# Patient Record
Sex: MALE | Race: WHITE
[De-identification: names, ages, dates, MRNs, and addresses within clinical notes are randomized per-mention and may not be internally consistent; named-entity substitution may affect disease eponyms.]

---

## 2018-08-18 ENCOUNTER — HOSPITAL ENCOUNTER (EMERGENCY)
Dept: HOSPITAL 47 - EC | Age: 73
Discharge: HOME | End: 2018-08-18
Payer: MEDICARE

## 2018-08-18 VITALS — DIASTOLIC BLOOD PRESSURE: 79 MMHG | SYSTOLIC BLOOD PRESSURE: 195 MMHG

## 2018-08-18 VITALS — HEART RATE: 61 BPM | RESPIRATION RATE: 17 BRPM

## 2018-08-18 VITALS — TEMPERATURE: 97.5 F

## 2018-08-18 DIAGNOSIS — Z79.84: ICD-10-CM

## 2018-08-18 DIAGNOSIS — Z53.8: ICD-10-CM

## 2018-08-18 DIAGNOSIS — E11.22: ICD-10-CM

## 2018-08-18 DIAGNOSIS — R11.0: ICD-10-CM

## 2018-08-18 DIAGNOSIS — E87.5: ICD-10-CM

## 2018-08-18 DIAGNOSIS — E07.9: ICD-10-CM

## 2018-08-18 DIAGNOSIS — I12.9: ICD-10-CM

## 2018-08-18 DIAGNOSIS — Z95.0: ICD-10-CM

## 2018-08-18 DIAGNOSIS — R94.4: ICD-10-CM

## 2018-08-18 DIAGNOSIS — N17.9: ICD-10-CM

## 2018-08-18 DIAGNOSIS — Z86.73: ICD-10-CM

## 2018-08-18 DIAGNOSIS — Z79.899: ICD-10-CM

## 2018-08-18 DIAGNOSIS — K52.9: ICD-10-CM

## 2018-08-18 DIAGNOSIS — N13.30: ICD-10-CM

## 2018-08-18 DIAGNOSIS — R18.8: ICD-10-CM

## 2018-08-18 DIAGNOSIS — R53.1: Primary | ICD-10-CM

## 2018-08-18 DIAGNOSIS — N18.9: ICD-10-CM

## 2018-08-18 LAB
ALBUMIN SERPL-MCNC: 4.3 G/DL (ref 3.5–5)
ALP SERPL-CCNC: 87 U/L (ref 38–126)
ALT SERPL-CCNC: 38 U/L (ref 21–72)
ANION GAP SERPL CALC-SCNC: 12 MMOL/L
APTT BLD: 22.7 SEC (ref 22–30)
AST SERPL-CCNC: 25 U/L (ref 17–59)
BASOPHILS # BLD AUTO: 0 K/UL (ref 0–0.2)
BASOPHILS NFR BLD AUTO: 0 %
BUN SERPL-SCNC: 56 MG/DL (ref 9–20)
CALCIUM SPEC-MCNC: 9.1 MG/DL (ref 8.4–10.2)
CHLORIDE SERPL-SCNC: 108 MMOL/L (ref 98–107)
CK SERPL-CCNC: 132 U/L (ref 55–170)
CO2 SERPL-SCNC: 21 MMOL/L (ref 22–30)
EOSINOPHIL # BLD AUTO: 0 K/UL (ref 0–0.7)
EOSINOPHIL NFR BLD AUTO: 0 %
ERYTHROCYTE [DISTWIDTH] IN BLOOD BY AUTOMATED COUNT: 2.72 M/UL (ref 4.3–5.9)
ERYTHROCYTE [DISTWIDTH] IN BLOOD: 13.7 % (ref 11.5–15.5)
GLUCOSE BLD-MCNC: 295 MG/DL (ref 75–99)
GLUCOSE SERPL-MCNC: 297 MG/DL (ref 74–99)
GLUCOSE UR QL: (no result)
HCT VFR BLD AUTO: 27.5 % (ref 39–53)
HGB BLD-MCNC: 8.9 GM/DL (ref 13–17.5)
INR PPP: 1 (ref ?–1.2)
LYMPHOCYTES # SPEC AUTO: 0.3 K/UL (ref 1–4.8)
LYMPHOCYTES NFR SPEC AUTO: 3 %
MCH RBC QN AUTO: 32.8 PG (ref 25–35)
MCHC RBC AUTO-ENTMCNC: 32.3 G/DL (ref 31–37)
MCV RBC AUTO: 101.4 FL (ref 80–100)
MONOCYTES # BLD AUTO: 0.2 K/UL (ref 0–1)
MONOCYTES NFR BLD AUTO: 3 %
NEUTROPHILS # BLD AUTO: 7.9 K/UL (ref 1.3–7.7)
NEUTROPHILS NFR BLD AUTO: 93 %
PH UR: 7 [PH] (ref 5–8)
PLATELET # BLD AUTO: 146 K/UL (ref 150–450)
POTASSIUM SERPL-SCNC: 6.1 MMOL/L (ref 3.5–5.1)
PROT SERPL-MCNC: 6.9 G/DL (ref 6.3–8.2)
PROT UR QL: (no result)
PT BLD: 9.9 SEC (ref 9–12)
RBC UR QL: <1 /HPF (ref 0–5)
SODIUM SERPL-SCNC: 141 MMOL/L (ref 137–145)
SP GR UR: 1.01 (ref 1–1.03)
SQUAMOUS UR QL AUTO: <1 /HPF (ref 0–4)
TROPONIN I SERPL-MCNC: 0.01 NG/ML (ref 0–0.03)
UROBILINOGEN UR QL STRIP: <2 MG/DL (ref ?–2)
WBC # BLD AUTO: 8.5 K/UL (ref 3.8–10.6)
WBC #/AREA URNS HPF: 1 /HPF (ref 0–5)

## 2018-08-18 PROCEDURE — 84484 ASSAY OF TROPONIN QUANT: CPT

## 2018-08-18 PROCEDURE — 81001 URINALYSIS AUTO W/SCOPE: CPT

## 2018-08-18 PROCEDURE — 36415 COLL VENOUS BLD VENIPUNCTURE: CPT

## 2018-08-18 PROCEDURE — 74176 CT ABD & PELVIS W/O CONTRAST: CPT

## 2018-08-18 PROCEDURE — 80053 COMPREHEN METABOLIC PANEL: CPT

## 2018-08-18 PROCEDURE — 84132 ASSAY OF SERUM POTASSIUM: CPT

## 2018-08-18 PROCEDURE — 83605 ASSAY OF LACTIC ACID: CPT

## 2018-08-18 PROCEDURE — 96375 TX/PRO/DX INJ NEW DRUG ADDON: CPT

## 2018-08-18 PROCEDURE — 85730 THROMBOPLASTIN TIME PARTIAL: CPT

## 2018-08-18 PROCEDURE — 93005 ELECTROCARDIOGRAM TRACING: CPT

## 2018-08-18 PROCEDURE — 71046 X-RAY EXAM CHEST 2 VIEWS: CPT

## 2018-08-18 PROCEDURE — 82553 CREATINE MB FRACTION: CPT

## 2018-08-18 PROCEDURE — 85610 PROTHROMBIN TIME: CPT

## 2018-08-18 PROCEDURE — 99285 EMERGENCY DEPT VISIT HI MDM: CPT

## 2018-08-18 PROCEDURE — 82550 ASSAY OF CK (CPK): CPT

## 2018-08-18 PROCEDURE — 85025 COMPLETE CBC W/AUTO DIFF WBC: CPT

## 2018-08-18 PROCEDURE — 96374 THER/PROPH/DIAG INJ IV PUSH: CPT

## 2018-08-18 NOTE — CT
EXAMINATION TYPE: CT abdomen pelvis wo con

 

DATE OF EXAM: 8/18/2018

 

HISTORY: Generalized pain with nausea

 

CT DLP: 275.4 mGycm.  Automated Exposure Control for Dose Reduction was Utilized.

 

TECHNIQUE:  CT scan of the abdomen and pelvis is performed without oral or IV contrast.

 

COMPARISON: NONE

 

FINDINGS:  Within the limitations of a non-contrast study, the following observations are made.

 

LUNG BASES: There are trace right greater than left pleural effusions. There is cardiomegaly with rig
ht-sided pacemaker. There is trace pericardial effusion.

 

LIVER/GB: No significant abnormality is appreciated.

 

PANCREAS: No significant abnormality is seen.

 

SPLEEN: No significant abnormality is seen.

 

ADRENALS: No significant abnormality is seen.

 

KIDNEYS: There is some cortical thinning in both kidneys. Central vascular calcification is present b
ilaterally. There is mild right-sided hydronephrosis. No obstructing calculi are clearly seen. Bladde
r is mildly distended.

 

BOWEL: Evaluation bowel is suboptimal secondary to lack of enteric contrast. There is no suspicious d
ilatation of the stomach. Majority of small bowel shows no suspicious dilatation. There is mild wall 
thickening in the right colon extending into the proximal transverse colon there is mild wall thicken
ing in the sigmoid colon. There are few scattered diverticula in the sigmoid colon appendix is gas-fi
lled and not dilated. There is mild ill-defined fluid throughout the mesentery including the paracoli
c gutters with similar some mild fluid surrounding the appendix

 

GENITAL ORGANS: Suboptimal evaluation prostate gland due to streak artifact from adjacent hips.

 

LYMPH NODES: No greater than 1cm abdominal or pelvic lymph nodes are appreciated.

 

OSSEOUS STRUCTURES: Metallic hardware from bilateral proximal femur surgery is identified causing str
eak artifact limiting evaluation of pelvic structures. Osseous structures are demineralized there is 
moderate compression fracture at T12 level with sclerosis presumed chronic. Moderate compression frac
ture L2 level is noted. There is advanced compression fracture L3 level with vertebroplasty. There is
 mild to moderate inferior compression fracture L5 level. There is mild compression fracture L1 level


 

OTHER: There is severe vascular calcification of smaller vessels including prominent vascular calcifi
cation of groin vessels.

 

IMPRESSION: No bowel obstruction. Mild diffuse abdominal and mesenteric ascites of uncertain etiology
. No well-formed fluid collection is seen. Possible multifocal colitis, correlate clinically. Subopti
mal evaluation without enteric contrast. Mild right-sided hydronephrosis of uncertain etiology as no 
obstructing calculus is clearly seen. Multiple chronic compression fractures noted.

## 2018-08-18 NOTE — XR
EXAMINATION TYPE: XR chest 2V

 

DATE OF EXAM: 8/18/2018

 

COMPARISON: Chest x-ray from August 9, 2016.CT chest August 6, 2016.

 

HISTORY: Weakness.

 

TECHNIQUE:  Frontal and lateral views of the chest are obtained.

 

FINDINGS:  Old fracture left humeral head level is partially imaged. There is redemonstration of card
iomegaly with dual lead pacemaker and atherosclerotic thoracic aorta. There is chronic parenchymal ch
inés without suspicious focal airspace opacity, pleural effusion, or pneumothorax seen bilaterally on
 current study. Chronic compression fracture felt present roughly L1 level on lateral view.

 

IMPRESSION:  Chronic changes and cardiomegaly without acute pulmonary process.

## 2018-08-18 NOTE — ED
General Adult HPI





- General


Chief complaint: Weakness


Stated complaint: NVD, STATES RENAL FAILURE


Source: patient


Mode of arrival: wheelchair


Limitations: no limitations





- History of Present Illness


Initial comments: 





Dictation was produced using dragon dictation software. please excuse any 

grammatical, word or spelling errors. 





Chief Complaint: 73-year-old male chronic kidney disease, arrhythmia presents 

with generalized weakness.





History of Present Illness: Patient is a retired cardiothoracic surgeon.  He 

states that he gets most of his medical care [place].  He bounces back between 

here and [place] for his personal life.  Patient states that over the past 

couple days he has been having nausea.  Denies any vomiting.  This date he has 

some abdominal cramping.  Patient has a history of diabetes.  He has been 

compliant with his medications.  Patient has a operating right upper extremity 

AV fistula that is placed in anticipation for future and imminent dialysis.  

Denies any constitutional symptoms.  Denies any pain in his flank or his back








The ROS documented in this emergency department record has been reviewed and 

confirmed by me.  Those systems with pertinent positive or negative responses 

have been documented in the HPI.  All other systems are other negative and/or 

noncontributory.





- Related Data


 Home Medications











 Medication  Instructions  Recorded  Confirmed


 


Amiodarone [Cordarone] 200 mg PO DAILY 07/15/16 08/18/18


 


Atorvastatin [Lipitor] 10 mg PO HS 07/15/16 08/18/18


 


Cholecalciferol [Vitamin D3] 2,000 unit PO BID 07/15/16 08/18/18


 


Florastor Probiotic 250 mg PO DAILY 07/15/16 08/18/18


 


Linagliptin [Tradjenta] 5 mg PO DAILY 07/15/16 08/18/18


 


NIFEdipine XL [Procardia XL] 30 mg PO DAILY 07/15/16 08/18/18


 


Sodium Bicarbonate Tab 650 mg PO DAILY 07/15/16 08/18/18


 


Denosumab [Prolia] 60 mg SQ Q180D 08/18/18 08/18/18


 


Docusate [Colace] 100 mg PO DAILY 08/18/18 08/18/18


 


Glimepiride [Amaryl] 4 mg PO BID 08/18/18 08/18/18


 


Labetalol HCl 100 mg PO BID 08/18/18 08/18/18


 


Levothyroxine Sodium [Synthroid] 125 mcg PO DAILY 08/18/18 08/18/18


 


traMADol HCL [Ultram] 50 mg PO Q6HR PRN 08/18/18 08/18/18








 Previous Rx's











 Medication  Instructions  Recorded


 


Aspirin 162 mg PO DAILY #0 chew 08/09/16


 


Metoclopramide HCl [Reglan] 10 mg PO BID PRN #12 tablet 08/18/18











 Allergies











Allergy/AdvReac Type Severity Reaction Status Date / Time


 


No Known Allergies Allergy   Verified 08/18/18 16:54














Review of Systems


ROS Statement: 


Those systems with pertinent positive or pertinent negative responses have been 

documented in the HPI.





ROS Other: All systems not noted in ROS Statement are negative.





Past Medical History


Past Medical History: CVA/TIA, Diabetes Mellitus, Hypertension, Pneumonia, 

Renal Disease, Thyroid Disorder


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 

EYE MAC DEGENERATION, CEREBRAL BLEED 2012, LT LEG A BIT WEAKER THAN RT AND 

PROBLEM W/ SPACE PERCEPTION, SHINGLES 2002,CONSTIPATION D/T PAIN MEDS; Dupuytren

's contracture on the right fifth digit; right inguinal hernia repair as a child

; right knee arthroscopy


Last Myocardial Infarction Date:: No history of myocardial infarction


History of Any Multi-Drug Resistant Organisms: None Reported


Past Surgical History: Pacemaker


Additional Past Surgical History / Comment(s): fistula, ZAKIA CATARACTS, ORIF RT 

HIP, LT HIP ORIG/PLATE, LT FOOT 2ND TOE AMP secondary to MSSA sepsis,Dupuytren'

s CONTRACTURE SX left, RT INGUINAL HERNUA


Additional Past Anesthesia/Blood Transfusion Reaction / Comment(s): BLOOD 

TRANSFUSION-


Type of Cardiac Device: Permanent Pacemaker


Device Placement Date:: NK


Past Psychological History: No Psychological Hx Reported


Smoking Status: Never smoker


Past Alcohol Use History: Daily


Past Drug Use History: None Reported





- Past Family History


  ** Father


Family Medical History: Pneumonia





  ** Mother


Family Medical History: No Reported History


Additional Family Medical History / Comment(s): CEREBRAL HEMORRAGE





General Exam





- General Exam Comments


Initial Comments: 











PHYSICAL EXAM:


General Impression: Alert and oriented x3, not in acute distress


HEENT: Normocephalic atraumatic, extra-ocular movements intact, pupils equal 

and reactive to light bilaterally, dry mucous members


Cardiovascular: Heart regular rate and rhythm, S1&S2 audible, no murmurs, rubs 

or gallops


Chest: Lungs clear to auscultation bilaterally, no rhonchi, no wheeze, no rales


Abdomen: Bowel sounds present, abdomen soft, non-tender, non-distended, no 

organomegaly


Musculoskeletal: Pulses present and equal in all extremities, no peripheral 

edema, right upper extremity AV fistula with palpable thrill and audible bruit


Motor: Power 5/5 bilaterally, no focal deficits noted


Neurological: CN II-XII grossly intact, no focal motor or sensory deficits noted


Skin: Intact with no visualized rashes


Psych: Normal affect and mood


Limitations: no limitations





Course


 Vital Signs











  08/18/18 08/18/18 08/18/18





  16:37 17:14 17:37


 


Temperature 97.5 F L  


 


Pulse Rate 65 91 


 


Pulse Rate [   61





Cardiac Monitor   





]   


 


Respiratory 18 18 





Rate   


 


Blood Pressure 165/62 170/90 


 


O2 Sat by Pulse 98  





Oximetry   














  08/18/18 08/18/18 08/18/18





  18:48 19:45 20:07


 


Temperature   


 


Pulse Rate 60 61 


 


Pulse Rate [   





Cardiac Monitor   





]   


 


Respiratory 18 17 





Rate   


 


Blood Pressure 212/86 199/79 200/82


 


O2 Sat by Pulse 93 L 96 





Oximetry   














  08/18/18





  21:24


 


Temperature 


 


Pulse Rate 


 


Pulse Rate [ 





Cardiac Monitor 





] 


 


Respiratory 





Rate 


 


Blood Pressure 195/79


 


O2 Sat by Pulse 





Oximetry 














Medical Decision Making





- Medical Decision Making











ED course: 73-year-old male with chief complaint of generalized weakness.  

Initial vital signs showed blood pressure of 212/86.  Laboratory evaluation 

obtained.  Hemoglobin 8.9.  Which is near his baseline.  Denies any GI 

bleeding.  CBC is unremarkable coag panel is negative.  Metabolic panel shows 

creatinine of 4.3.  This is mildly elevated compared to previous creatinine 

levels approximately 1 year ago where his level was 3.4.  He also does have 

elevation in his blood urea nitrogen.  Potassium initially was 6.1.  Cardiac 

enzymes are negative.  Urinalysis shows 4+ glucose and trace ketones.  There is 

no anion gap and there is mild bicarb elevation.  Chest x-ray shows chronic 

changes without acute processes.  Computed tomography scan of the abdomen and 

pelvis showed mild mesenteric ascites of unknown etiology, multifocal colitis, 

mild right-sided hydronephrosis without obstructing calculus.  Patient given 

hyperkalemia cocktail.  There is no discernible hyperkalemia changes on EKG.  

Patient had repeat potassium check which is found to be 5.3.  Patient was 

adamant about being discharged from the emergency department.  He was adamant 

about refusing admission for further care.  At this point is unclear what is 

causing his nausea.  There is findings of acute kidney injury suspicion with 

elevated renal markers and hyperkalemia.  There is also some suspicion that 

patient's symptoms may represent mild diabetic ketoacidosis.  Patient is given 

intravenous fluids and antinausea medications.  There is also some click 

suspicion that patient had recently passed nonobstructed kidney stone given 

findings of hydronephrosis on the right.  Discussed with patient that if he 

wants to be discharge that he should return to the emergency department if he 

expense any worsening symptoms.  Patient given labetalol for elevated blood 

pressure.  Patient has a history of hypertension and he missed his labetalol 

dose.  Discussed with patient that he should follow-up with his primary care 

physician as soon as possible.  Discussed patient that he is likely needing 

urgent hemodialysis soon.  Patient is understandable and agreeable to plan.  He 

is given prescription for Reglan.








EKG Interpretation:


A 12 lead EKG was obtained. It was interpreted by myself and attending 

physician. There is a P wave before every QRS complex. Rate is 61. Rhythm is H 

or paced rhythm, [place] interval 2:30, , QTc 537. QT is not prolonged. No ST 

segment depression or elevation.





- Lab Data


Result diagrams: 


 08/18/18 17:01





 08/18/18 21:06


 Lab Results











  08/18/18 08/18/18 08/18/18 Range/Units





  17:01 17:01 17:01 


 


WBC   8.5   (3.8-10.6)  k/uL


 


RBC   2.72 L   (4.30-5.90)  m/uL


 


Hgb   8.9 L   (13.0-17.5)  gm/dL


 


Hct   27.5 L   (39.0-53.0)  %


 


MCV   101.4 H   (80.0-100.0)  fL


 


MCH   32.8   (25.0-35.0)  pg


 


MCHC   32.3   (31.0-37.0)  g/dL


 


RDW   13.7   (11.5-15.5)  %


 


Plt Count   146 L   (150-450)  k/uL


 


Neutrophils %   93   %


 


Lymphocytes %   3   %


 


Monocytes %   3   %


 


Eosinophils %   0   %


 


Basophils %   0   %


 


Neutrophils #   7.9 H   (1.3-7.7)  k/uL


 


Lymphocytes #   0.3 L   (1.0-4.8)  k/uL


 


Monocytes #   0.2   (0-1.0)  k/uL


 


Eosinophils #   0.0   (0-0.7)  k/uL


 


Basophils #   0.0   (0-0.2)  k/uL


 


Macrocytosis   Slight   


 


PT     (9.0-12.0)  sec


 


INR     (<1.2)  


 


APTT     (22.0-30.0)  sec


 


Sodium    141  (137-145)  mmol/L


 


Potassium    6.1 H  (3.5-5.1)  mmol/L


 


Chloride    108 H  ()  mmol/L


 


Carbon Dioxide    21 L  (22-30)  mmol/L


 


Anion Gap    12  mmol/L


 


BUN    56 H  (9-20)  mg/dL


 


Creatinine    4.30 H  (0.66-1.25)  mg/dL


 


Est GFR (CKD-EPI)AfAm    15  (>60 ml/min/1.73 sqM)  


 


Est GFR (CKD-EPI)NonAf    13  (>60 ml/min/1.73 sqM)  


 


Glucose    297 H  (74-99)  mg/dL


 


POC Glucose (mg/dL)     (75-99)  mg/dL


 


POC Glu Operater ID     


 


Plasma Lactic Acid Richy     (0.7-2.0)  mmol/L


 


Calcium    9.1  (8.4-10.2)  mg/dL


 


Total Bilirubin    0.7  (0.2-1.3)  mg/dL


 


AST    25  (17-59)  U/L


 


ALT    38  (21-72)  U/L


 


Alkaline Phosphatase    87  ()  U/L


 


Total Creatine Kinase  132    ()  U/L


 


CK-MB (CK-2)  3.0 H*    (0.0-2.4)  ng/mL


 


CK-MB (CK-2) Rel Index  2.3    


 


Troponin I  0.014    (0.000-0.034)  ng/mL


 


Total Protein    6.9  (6.3-8.2)  g/dL


 


Albumin    4.3  (3.5-5.0)  g/dL


 


Urine Color     


 


Urine Appearance     (Clear)  


 


Urine pH     (5.0-8.0)  


 


Ur Specific Gravity     (1.001-1.035)  


 


Urine Protein     (Negative)  


 


Urine Glucose (UA)     (Negative)  


 


Urine Ketones     (Negative)  


 


Urine Blood     (Negative)  


 


Urine Nitrite     (Negative)  


 


Urine Bilirubin     (Negative)  


 


Urine Urobilinogen     (<2.0)  mg/dL


 


Ur Leukocyte Esterase     (Negative)  


 


Urine RBC     (0-5)  /hpf


 


Urine WBC     (0-5)  /hpf


 


Ur Squamous Epith Cells     (0-4)  /hpf














  08/18/18 08/18/18 08/18/18 Range/Units





  17:01 17:01 18:00 


 


WBC     (3.8-10.6)  k/uL


 


RBC     (4.30-5.90)  m/uL


 


Hgb     (13.0-17.5)  gm/dL


 


Hct     (39.0-53.0)  %


 


MCV     (80.0-100.0)  fL


 


MCH     (25.0-35.0)  pg


 


MCHC     (31.0-37.0)  g/dL


 


RDW     (11.5-15.5)  %


 


Plt Count     (150-450)  k/uL


 


Neutrophils %     %


 


Lymphocytes %     %


 


Monocytes %     %


 


Eosinophils %     %


 


Basophils %     %


 


Neutrophils #     (1.3-7.7)  k/uL


 


Lymphocytes #     (1.0-4.8)  k/uL


 


Monocytes #     (0-1.0)  k/uL


 


Eosinophils #     (0-0.7)  k/uL


 


Basophils #     (0-0.2)  k/uL


 


Macrocytosis     


 


PT   9.9   (9.0-12.0)  sec


 


INR   1.0   (<1.2)  


 


APTT   22.7   (22.0-30.0)  sec


 


Sodium     (137-145)  mmol/L


 


Potassium     (3.5-5.1)  mmol/L


 


Chloride     ()  mmol/L


 


Carbon Dioxide     (22-30)  mmol/L


 


Anion Gap     mmol/L


 


BUN     (9-20)  mg/dL


 


Creatinine     (0.66-1.25)  mg/dL


 


Est GFR (CKD-EPI)AfAm     (>60 ml/min/1.73 sqM)  


 


Est GFR (CKD-EPI)NonAf     (>60 ml/min/1.73 sqM)  


 


Glucose     (74-99)  mg/dL


 


POC Glucose (mg/dL)     (75-99)  mg/dL


 


POC Glu Operater ID     


 


Plasma Lactic Acid Richy  0.8    (0.7-2.0)  mmol/L


 


Calcium     (8.4-10.2)  mg/dL


 


Total Bilirubin     (0.2-1.3)  mg/dL


 


AST     (17-59)  U/L


 


ALT     (21-72)  U/L


 


Alkaline Phosphatase     ()  U/L


 


Total Creatine Kinase     ()  U/L


 


CK-MB (CK-2)     (0.0-2.4)  ng/mL


 


CK-MB (CK-2) Rel Index     


 


Troponin I     (0.000-0.034)  ng/mL


 


Total Protein     (6.3-8.2)  g/dL


 


Albumin     (3.5-5.0)  g/dL


 


Urine Color    Light Yellow  


 


Urine Appearance    Clear  (Clear)  


 


Urine pH    7.0  (5.0-8.0)  


 


Ur Specific Gravity    1.009  (1.001-1.035)  


 


Urine Protein    2+ H  (Negative)  


 


Urine Glucose (UA)    4+ H  (Negative)  


 


Urine Ketones    Trace H  (Negative)  


 


Urine Blood    Trace H  (Negative)  


 


Urine Nitrite    Negative  (Negative)  


 


Urine Bilirubin    Negative  (Negative)  


 


Urine Urobilinogen    <2.0  (<2.0)  mg/dL


 


Ur Leukocyte Esterase    Negative  (Negative)  


 


Urine RBC    <1  (0-5)  /hpf


 


Urine WBC    1  (0-5)  /hpf


 


Ur Squamous Epith Cells    <1  (0-4)  /hpf














  08/18/18 08/18/18 Range/Units





  21:05 21:06 


 


WBC    (3.8-10.6)  k/uL


 


RBC    (4.30-5.90)  m/uL


 


Hgb    (13.0-17.5)  gm/dL


 


Hct    (39.0-53.0)  %


 


MCV    (80.0-100.0)  fL


 


MCH    (25.0-35.0)  pg


 


MCHC    (31.0-37.0)  g/dL


 


RDW    (11.5-15.5)  %


 


Plt Count    (150-450)  k/uL


 


Neutrophils %    %


 


Lymphocytes %    %


 


Monocytes %    %


 


Eosinophils %    %


 


Basophils %    %


 


Neutrophils #    (1.3-7.7)  k/uL


 


Lymphocytes #    (1.0-4.8)  k/uL


 


Monocytes #    (0-1.0)  k/uL


 


Eosinophils #    (0-0.7)  k/uL


 


Basophils #    (0-0.2)  k/uL


 


Macrocytosis    


 


PT    (9.0-12.0)  sec


 


INR    (<1.2)  


 


APTT    (22.0-30.0)  sec


 


Sodium    (137-145)  mmol/L


 


Potassium   5.3 H  (3.5-5.1)  mmol/L


 


Chloride    ()  mmol/L


 


Carbon Dioxide    (22-30)  mmol/L


 


Anion Gap    mmol/L


 


BUN    (9-20)  mg/dL


 


Creatinine    (0.66-1.25)  mg/dL


 


Est GFR (CKD-EPI)AfAm    (>60 ml/min/1.73 sqM)  


 


Est GFR (CKD-EPI)NonAf    (>60 ml/min/1.73 sqM)  


 


Glucose    (74-99)  mg/dL


 


POC Glucose (mg/dL)  295 H   (75-99)  mg/dL


 


POC Glu Operater ID  Ashley Barrow   


 


Plasma Lactic Acid Richy    (0.7-2.0)  mmol/L


 


Calcium    (8.4-10.2)  mg/dL


 


Total Bilirubin    (0.2-1.3)  mg/dL


 


AST    (17-59)  U/L


 


ALT    (21-72)  U/L


 


Alkaline Phosphatase    ()  U/L


 


Total Creatine Kinase    ()  U/L


 


CK-MB (CK-2)    (0.0-2.4)  ng/mL


 


CK-MB (CK-2) Rel Index    


 


Troponin I    (0.000-0.034)  ng/mL


 


Total Protein    (6.3-8.2)  g/dL


 


Albumin    (3.5-5.0)  g/dL


 


Urine Color    


 


Urine Appearance    (Clear)  


 


Urine pH    (5.0-8.0)  


 


Ur Specific Gravity    (1.001-1.035)  


 


Urine Protein    (Negative)  


 


Urine Glucose (UA)    (Negative)  


 


Urine Ketones    (Negative)  


 


Urine Blood    (Negative)  


 


Urine Nitrite    (Negative)  


 


Urine Bilirubin    (Negative)  


 


Urine Urobilinogen    (<2.0)  mg/dL


 


Ur Leukocyte Esterase    (Negative)  


 


Urine RBC    (0-5)  /hpf


 


Urine WBC    (0-5)  /hpf


 


Ur Squamous Epith Cells    (0-4)  /hpf














Disposition


Clinical Impression: 


 Weakness





Disposition: HOME SELF-CARE


Condition: Fair


Instructions:  Hyperkalemia (ED)


Prescriptions: 


Metoclopramide HCl [Reglan] 10 mg PO BID PRN #12 tablet


 PRN Reason: Nausea


Is patient prescribed a controlled substance at d/c from ED?: No


Referrals: 


Miguelangel Wild MD [Primary Care Provider] - 1-2 days


Time of Disposition: 21:33